# Patient Record
Sex: FEMALE | Race: WHITE | NOT HISPANIC OR LATINO | Employment: FULL TIME | URBAN - METROPOLITAN AREA
[De-identification: names, ages, dates, MRNs, and addresses within clinical notes are randomized per-mention and may not be internally consistent; named-entity substitution may affect disease eponyms.]

---

## 2022-06-15 ENCOUNTER — OFFICE VISIT (OUTPATIENT)
Dept: PODIATRY | Facility: CLINIC | Age: 27
End: 2022-06-15
Payer: COMMERCIAL

## 2022-06-15 VITALS — BODY MASS INDEX: 25.01 KG/M2 | WEIGHT: 165 LBS | HEIGHT: 68 IN

## 2022-06-15 DIAGNOSIS — M20.5X2 HALLUX LIMITUS OF LEFT FOOT: ICD-10-CM

## 2022-06-15 DIAGNOSIS — S99.922A INJURY OF LEFT FOOT, INITIAL ENCOUNTER: Primary | ICD-10-CM

## 2022-06-15 DIAGNOSIS — S90.32XA CONTUSION OF LEFT FOOT, INITIAL ENCOUNTER: ICD-10-CM

## 2022-06-15 PROCEDURE — 99203 OFFICE O/P NEW LOW 30 MIN: CPT | Performed by: PODIATRIST

## 2022-06-15 NOTE — PROGRESS NOTES
ASSESSMENT:    Encounter Diagnoses   Name Primary?     Injury of left foot, initial encounter Yes     Contusion of left foot, initial encounter      Hallux limitus of left foot      MEDICAL DECISION MAKING:  Considering that a 45 pound dumbbell dropped from knee height on her left foot, and not finding anything clinically or radiographically to suggest injury beyond possible contusion.    I personally reviewed the x-ray images with Meli.  No fractures or dislocations seen.    She really is not experiencing pain where the weight reportedly hit her foot.  Her more lateral foot pain is likely from alteration of gait at the injury.    Recommendations:  We discussed the use of a short immobilizing boot.  She opted to forego this.  Think it is reasonable for her to focus on supportive shoes.  She is to avoid higher impact activities on the foot until she has recovered.  NSAIDs and Tylenol discussed.  Ice and elevation as needed    Follow-up in 1 month in some form.  If she is doing better, Wi3 message is fine.    NOTE: She was found to have hallux limitus on the left.  She reports some occasions of pain around this joint.  I discussed the chance of developing osteoarthritis and additional pain.  Stiffer soled shoes are recommended.    Disclaimer: This note consists of symbols derived from keyboarding, dictation and/or voice recognition software. As a result, there may be errors in the script that have gone undetected. Please consider this when interpreting information found in this chart.    Tien Segura DPM, FACFAS, [facility] Department of Podiatry/Foot & Ankle Surgery      ____________________________________________________________________    HPI:       Meli presents for evaluation of the left foot.  She dropped a 45 pound dumbbell on the foot from approximately knee height 1 week ago.  She is concerned about a fracture.  She has some aching and shooting in the foot.  She reports that the dumbbell hit her  "foot more central and medial.  Her current pain is more along the lateral aspect of the foot.  Pain is intermittent.  It is worse with walking, exercise and jumping.  She says she was doing well and then use a trampoline yesterday.  She has applied ice and taken ibuprofen.    Reports that she did not have significant pain immediately.  There was no bruising or swelling seen.    She works as a   She does power lifting 5 to 6 days a week and also rockclimbing.    *No past medical history on file.*  *No past surgical history on file.*  *  No current outpatient medications on file.         EXAM:    Vitals: Ht 1.727 m (5' 8\")   Wt 74.8 kg (165 lb)   Breastfeeding No   BMI 25.09 kg/m    BMI: Body mass index is 25.09 kg/m .    Constitutional:  Lakeisha Mcguire is in no apparent distress, appears well-nourished.  Cooperative with history and physical exam.    Vascular:  Pedal pulses are palpable for both the DP and PT arteries.  CFT < 3 sec.  No edema.      Neuro: Light touch sensation is intact to the L4, L5, S1 distributions  No evidence of weakness, spasticity, or contracture in the lower extremities.     Derm: Normal texture and turgor.  No erythema, ecchymosis, or cyanosis.  No open lesions.     Musculoskeletal:    Lower extremity muscle strength is normal.  Flatfoot structure with weightbearing.  Adequate ankle and subtalar joint range of motion bilaterally.  No pain reproduced on palpatory exam today.    X-Ray Findings:  I personally reviewed the left foot images.  Negative        "

## 2022-06-15 NOTE — PATIENT INSTRUCTIONS
"Thank you for choosing [facility] Podiatry / Foot & Ankle Surgery!    DR[facility]AN[facility]TISTE[facility]S CLIN[facility] LOCATIONS:     [facility] TRIAGE LINE: [phone]   [address] APPOINTMENTS: [phone]   [place] [address] RADIOLOGY: [phone]    SET UP SURGERY: [phone]    BILLING QUESTIONS: [phone]   [facility] FAX: [phone]   [address]    [place] [address]      Follow up: 1 month    Next steps: walking boot    [place] [facility] LOCATIONS  [facility]  [address]  [place] [address]  Phone: [phone]  Fax: [phone] [facility]  [address]  [place] [address]  Phone: [phone]   Fax: [phone]   [facility]  [address]  [place] [address]  Phone: [phone]  Fax: [phone] [facility]  [address]  [place] [address]  Phone: [phone]   Fax: [phone]   [facility]   [address]  [place] [address]  Phone: [phone]  Fax: [phone] * Please call any location listed to make an appointment for a casting/fitting. Your referral was sent to their central office and they will all have the order on file.           AIRCAST / CAM WALKING BOOT INSTRUCTIONS  - Do NOT drive with CAM walker on. This is due to safety and legal issues.   - Do NOT wear the CAM walker on long car/train rides or on an airplane.  - Remove the CAM walker several times a day and do ankle range of motion (ROM) exercises/wiggle toes.  - It is recommended that a thick-soled shoe be worn on the other foot to offset any created leg length issue.    - You can purchase an \"even up\" on [employer] to place under the other shoe to help too.  - The boot does not have to be worn at night.   - There is an increased risk of developing a blood clot with lower extremity immobilization. ROM exercises and knee-high " compression (tenso /ACE wrap) is recommended to lower that risk.   - You should seek medical attention if you experience calf swelling and/or pain, chest pain, or shortness of breath.   If you need to return or exchange the boot, please contact [facility] @ [phone]    PRICE THERAPY  Many aches and pains throughout the foot and ankle can be helped with many simple treatments. This is usually described as PRICE Therapy.      P - Protection - often times, inflammation/pain in the lower extremity is not able to improve simply because the areas involved are never allowed to rest. Every step we take can bother the problematic area. Protecting those areas is an important step in the healing process. This may involve a walking cast boot, a special insert/orthotic device, an ankle brace, or simply avoiding barefoot walking.    R - Rest - in addition to protecting the foot/ankle, resting is an important, but often times difficult, treatment option. Getting off your feet when they bother you, and specifically avoiding activities that cause pain/discomfort, are very beneficial to prevent, and treat, foot/ankle pain.      I - Ice - icing regularly can help to decrease inflammation and swelling in the foot, thus decreasing pain. Using an ice pack or a bag of frozen veggies works very well. Ice for 20 minutes multiple times per day as needed.  Do not place the ice directly on the skin as this can cause tissue damage.    C - Compression - using a compression wrap or an ACE wrap can help to decrease swelling, which can help to decrease pain. Wearing the wraps is generally not needed at night, but they should be worn on a regular basis when you are going to be on your feet for prolonged periods as gravity tends to pull fluids down to your feet/ankles.    E - Elevation - elevating your lower extremities multiple times daily for 15-20 minutes can help to decrease swelling, which works well in decreasing pain  levels.    NSAID/Tylenol - Anti-inflammatories like Aleve or ibuprofen, and/or a pain medication, such as Tylenol, can help to improve pain levels and get the issue resolved sooner rather than later. Anyone with liver issues should be careful with Tylenol, and anyone with high blood pressure or heart, stomach or kidney issues should be careful with anti-inflammatories. Please ask if you have questions about these medications, including dosage.       FLAT FEET   Flatfoot is often a complex disorder, with diverse symptoms and varying degrees of deformity and disability. There are several types of flatfoot, all of which have one characteristic in common: partial or total collapse (loss) of the arch.  Other characteristics shared by most types of flatfoot include:   Toe drift,  in which the toes and front part of the foot point outward   The heel tilts toward the outside and the ankle appears to turn in   A tight Achilles tendon, which causes the heel to lift off the ground earlier when walking and may make the problem worse   Bunions and hammertoes may develop as a result of a flatfoot.   Flexible Flatfoot  Flexible flatfoot is one of the most common types of flatfoot. It typically begins in childhood or adolescence and continues into adulthood. It usually occurs in both feet and progresses in severity throughout the adult years. As the deformity worsens, the soft tissues (tendons and ligaments) of the arch may stretch or tear and can become inflamed.  The term  flexible  means that while the foot is flat when standing (weight-bearing), the arch returns when not standing.  SYMPTOMS  Pain in the heel, arch, ankle, or along the outside of the foot    Rolled-in  ankle (over-pronation)   Pain along the shin bone (shin splint)   General aching or fatigue in the foot or leg   Low back, hip or knee pain.   DIAGNOSIS  In diagnosing flatfoot, the foot and ankle surgeon examines the foot and observes how it looks when you stand  and sit. X-rays are usually taken to determine the severity of the disorder. If you are diagnosed with flexible flatfoot but you don t have any symptoms, your surgeon will explain what you might expect in the future.  NON-SURGICAL TREATMENT  If you experience symptoms with flexible flatfoot, the surgeon may recommend non-surgical treatment options, including:  Activity modifications. Cut down on activities that bring you pain and avoid prolonged walking and standing to give your arches a rest.   Weight loss. If you are overweight, try to lose weight. Putting too much weight on your arches may aggravate your symptoms.   Orthotic devices. Your foot and ankle surgeon can provide you with custom orthotic devices for your shoes to give more support to the arches.   Immobilization. In some cases, it may be necessary to use a walking cast or to completely avoid weight-bearing.   Medications. Nonsteroidal anti-inflammatory drugs (NSAIDs), such as ibuprofen, help reduce pain and inflammation.   Physical therapy. Ultrasound therapy or other physical therapy modalities may be used to provide temporary relief.   Shoe modifications. Wearing shoes that support the arches is important for anyone who has flatfoot.   SURGICAL TREATMENT  In some patients whose pain is not adequately relieved by other treatments, surgery may be considered. A variety of surgical techniques is available to correct flexible flatfoot, and one or a combination of procedures may be required to relieve the symptoms and improve foot function.  In selecting the procedure or combination of procedures for your particular case, the foot and ankle surgeon will take into consideration the extent of your deformity based on the x-ray findings, your age, your activity level, and other factors. The length of the recovery period will vary, depending on the procedure or procedures performed.